# Patient Record
Sex: FEMALE
[De-identification: names, ages, dates, MRNs, and addresses within clinical notes are randomized per-mention and may not be internally consistent; named-entity substitution may affect disease eponyms.]

---

## 2019-08-26 ENCOUNTER — APPOINTMENT (OUTPATIENT)
Dept: SPINE | Facility: CLINIC | Age: 69
End: 2019-08-26
Payer: MEDICARE

## 2019-08-26 VITALS
SYSTOLIC BLOOD PRESSURE: 134 MMHG | RESPIRATION RATE: 14 BRPM | HEART RATE: 80 BPM | HEIGHT: 67 IN | TEMPERATURE: 98.4 F | WEIGHT: 180 LBS | OXYGEN SATURATION: 98 % | BODY MASS INDEX: 28.25 KG/M2 | DIASTOLIC BLOOD PRESSURE: 90 MMHG

## 2019-08-26 DIAGNOSIS — M21.70 UNEQUAL LIMB LENGTH (ACQUIRED), UNSPECIFIED SITE: ICD-10-CM

## 2019-08-26 DIAGNOSIS — M54.5 LOW BACK PAIN: ICD-10-CM

## 2019-08-26 DIAGNOSIS — G89.29 RADICULOPATHY, LUMBAR REGION: ICD-10-CM

## 2019-08-26 DIAGNOSIS — M54.16 RADICULOPATHY, LUMBAR REGION: ICD-10-CM

## 2019-08-26 DIAGNOSIS — M79.605 LOW BACK PAIN: ICD-10-CM

## 2019-08-26 PROCEDURE — 99203 OFFICE O/P NEW LOW 30 MIN: CPT

## 2019-08-26 NOTE — REASON FOR VISIT
[New Patient Visit] : a new patient visit [Referred By: _________] : Patient was referred by KEEGAN [Family Member] : family member [FreeTextEntry1] : Patient reports lumbar pain worsened  after a hip replacement   in 2012.. She has a left leg limb discrepancy ( 1 inch per patient). She wears a lift in her left shoe. She brings a recent lumbar MRI.

## 2019-08-26 NOTE — ASSESSMENT
[FreeTextEntry1] :  leg pain which very well may be related to different leg lengths. She will be referred to physiatry for formal leg length measurement and orthotics.  If her pain continues after the she will return for further investigation of her lumbar spine.

## 2019-08-26 NOTE — REVIEW OF SYSTEMS
[Difficulty Walking] : difficulty walking [Limping] : limping [As Noted in HPI] : as noted in HPI [Arthralgias] : arthralgias [Joint Stiffness] : joint stiffness [Joint Pain] : joint pain [Limb Pain] : limb pain [Negative] : Heme/Lymph [Heart Rate Is Slow] : the heart rate was not slow [Chest Pain] : no chest pain [Palpitations] : no palpitations [Shortness Of Breath] : no shortness of breath [Cough] : no cough [SOB on Exertion] : no shortness of breath during exertion

## 2019-08-26 NOTE — HISTORY OF PRESENT ILLNESS
[> 3 months] : more  than 3 months [de-identified] : 69-year-old woman who has had 10 years of chronic intermittent low back pain associated with pain radiating down the left leg to the ankle. This burning leg pain is made worse by walking or standing and is relieved by sitting down. She has had previous knee surgery and hip surgery. She feels her back and leg pain worse after the hip surgery. This pain has remained refractory to months of physical therapy, multiple epidural steroid injections, chiropractic treatment and various medication. He can very from 6-10 out of 10. A recent MRI scan shows degenerative scoliosis, advanced degenerative disc disease at multiple levels. There was no significant central stenosis however there is significant foraminal stenosis on the left at L5-S1. She notes different degrees with pain depending on which shoes she is wearing. Many shoes orthotics and different heights. She has never had formal measurements taken does believe her right leg is shorter than her left. [FreeTextEntry1] : back and left leg pain

## 2019-08-26 NOTE — PHYSICAL EXAM
[Person] : oriented to person [Place] : oriented to place [Time] : oriented to time [Short Term Intact] : short term memory intact [Remote Intact] : remote memory intact [Span Intact] : the attention span was normal [Fluency] : fluency intact [Concentration Intact] : normal concentrating ability [Comprehension] : comprehension intact [Current Events] : adequate knowledge of current events [Vocabulary] : adequate range of vocabulary [Past History] : adequate knowledge of personal past history [Cranial Nerves Optic (II)] : visual acuity intact bilaterally,  pupils equal round and reactive to light [Cranial Nerves Oculomotor (III)] : extraocular motion intact [Cranial Nerves Trigeminal (V)] : facial sensation intact symmetrically [Cranial Nerves Vestibulocochlear (VIII)] : hearing was intact bilaterally [Cranial Nerves Facial (VII)] : face symmetrical [Cranial Nerves Glossopharyngeal (IX)] : tongue and palate midline [Cranial Nerves Accessory (XI - Cranial And Spinal)] : head turning and shoulder shrug symmetric [Cranial Nerves Hypoglossal (XII)] : there was no tongue deviation with protrusion [Motor Tone] : muscle tone was normal in all four extremities [Motor Strength] : muscle strength was normal in all four extremities [No Muscle Atrophy] : normal bulk in all four extremities [Sensation Tactile Decrease] : light touch was intact [Abnormal Walk] : normal gait [Balance] : balance was intact [2+] : Ankle jerk right 2+ [No Tenderness to Palpation] : no spine tenderness on palpation [No Pain with ROM] : no pain with motion in any direction [Able to toe walk] : the patient was able to toe walk [Able to heel walk] : the patient was able to heel walk [Past-pointing] : there was no past-pointing [Tremor] : no tremor present [Plantar Reflex Left Only] : normal on the left [Plantar Reflex Right Only] : normal on the right [Snow] : Snow's sign was not demonstrated [Straight-Leg Raise Test - Left] : straight leg raise of the left leg was negative [Straight-Leg Raise Test - Right] : straight leg raise  of the right leg was negative

## 2019-08-26 NOTE — CONSULT LETTER
[Dear  ___] : Dear  [unfilled], [Consult Letter:] : I had the pleasure of evaluating your patient, [unfilled]. [Consult Closing:] : Thank you very much for allowing me to participate in the care of this patient.  If you have any questions, please do not hesitate to contact me. [Please see my note below.] : Please see my note below. [Sincerely,] : Sincerely, [FreeTextEntry3] : Danny Trujillo MD\par Chief of Neurosurgery [facility]\par [facility]\par